# Patient Record
Sex: FEMALE | Race: WHITE
[De-identification: names, ages, dates, MRNs, and addresses within clinical notes are randomized per-mention and may not be internally consistent; named-entity substitution may affect disease eponyms.]

---

## 2018-06-28 ENCOUNTER — HOSPITAL ENCOUNTER (OUTPATIENT)
Age: 63
End: 2018-06-28
Payer: COMMERCIAL

## 2018-06-28 DIAGNOSIS — M54.2: Primary | ICD-10-CM

## 2018-06-28 PROCEDURE — 72050 X-RAY EXAM NECK SPINE 4/5VWS: CPT

## 2018-09-10 ENCOUNTER — HOSPITAL ENCOUNTER (OUTPATIENT)
Dept: HOSPITAL 100 - PT | Age: 63
Discharge: HOME | End: 2018-09-10
Payer: COMMERCIAL

## 2018-09-10 DIAGNOSIS — M65.4: ICD-10-CM

## 2018-09-10 DIAGNOSIS — M75.31: Primary | ICD-10-CM

## 2018-09-10 PROCEDURE — 97035 APP MDLTY 1+ULTRASOUND EA 15: CPT

## 2018-09-10 PROCEDURE — 97033 APP MDLTY 1+IONTPHRSIS EA 15: CPT

## 2018-09-10 PROCEDURE — 97161 PT EVAL LOW COMPLEX 20 MIN: CPT

## 2018-09-10 PROCEDURE — 97110 THERAPEUTIC EXERCISES: CPT

## 2018-10-15 ENCOUNTER — HOSPITAL ENCOUNTER (OUTPATIENT)
Age: 63
End: 2018-10-15
Payer: COMMERCIAL

## 2018-10-15 DIAGNOSIS — Z12.4: Primary | ICD-10-CM

## 2018-10-15 PROCEDURE — G0145 SCR C/V CYTO,THINLAYER,RESCR: HCPCS

## 2018-10-15 PROCEDURE — 88175 CYTOPATH C/V AUTO FLUID REDO: CPT

## 2018-10-26 ENCOUNTER — HOSPITAL ENCOUNTER (OUTPATIENT)
Age: 63
End: 2018-10-26
Payer: COMMERCIAL

## 2018-10-26 DIAGNOSIS — Z12.31: Primary | ICD-10-CM

## 2018-10-26 PROCEDURE — 77067 SCR MAMMO BI INCL CAD: CPT

## 2018-10-26 PROCEDURE — 77063 BREAST TOMOSYNTHESIS BI: CPT

## 2018-12-07 ENCOUNTER — HOSPITAL ENCOUNTER (OUTPATIENT)
Age: 63
End: 2018-12-07
Payer: COMMERCIAL

## 2018-12-07 DIAGNOSIS — M75.81: ICD-10-CM

## 2018-12-07 DIAGNOSIS — M75.41: Primary | ICD-10-CM

## 2018-12-07 PROCEDURE — 73221 MRI JOINT UPR EXTREM W/O DYE: CPT

## 2019-09-24 ENCOUNTER — HOSPITAL ENCOUNTER (OUTPATIENT)
Dept: HOSPITAL 100 - HPRAD | Age: 64
Discharge: HOME | End: 2019-09-24
Payer: COMMERCIAL

## 2019-09-24 VITALS — BODY MASS INDEX: 26.3 KG/M2

## 2019-09-24 DIAGNOSIS — M79.644: ICD-10-CM

## 2019-09-24 DIAGNOSIS — M65.341: Primary | ICD-10-CM

## 2019-09-24 PROCEDURE — 73130 X-RAY EXAM OF HAND: CPT

## 2019-10-28 ENCOUNTER — HOSPITAL ENCOUNTER (OUTPATIENT)
Dept: HOSPITAL 100 - OPBI | Age: 64
Discharge: HOME | End: 2019-10-28
Payer: COMMERCIAL

## 2019-10-28 VITALS — BODY MASS INDEX: 26.3 KG/M2

## 2019-10-28 DIAGNOSIS — Z12.31: Primary | ICD-10-CM

## 2019-10-28 PROCEDURE — 77063 BREAST TOMOSYNTHESIS BI: CPT

## 2019-10-28 PROCEDURE — 77067 SCR MAMMO BI INCL CAD: CPT

## 2020-06-18 ENCOUNTER — HOSPITAL ENCOUNTER (OUTPATIENT)
Age: 65
End: 2020-06-18
Payer: COMMERCIAL

## 2020-06-18 VITALS — BODY MASS INDEX: 26.3 KG/M2

## 2020-06-18 DIAGNOSIS — M79.602: Primary | ICD-10-CM

## 2020-06-18 PROCEDURE — 72050 X-RAY EXAM NECK SPINE 4/5VWS: CPT

## 2020-06-25 ENCOUNTER — HOSPITAL ENCOUNTER (OUTPATIENT)
Age: 65
Discharge: HOME | End: 2020-06-25
Payer: COMMERCIAL

## 2020-06-25 VITALS — BODY MASS INDEX: 26.3 KG/M2

## 2020-06-25 DIAGNOSIS — G54.2: Primary | ICD-10-CM

## 2020-06-25 DIAGNOSIS — M50.10: ICD-10-CM

## 2020-06-25 PROCEDURE — 72141 MRI NECK SPINE W/O DYE: CPT

## 2020-07-07 ENCOUNTER — HOSPITAL ENCOUNTER (OUTPATIENT)
Dept: HOSPITAL 100 - HPRAD | Age: 65
Discharge: HOME | End: 2020-07-07
Payer: COMMERCIAL

## 2020-07-07 VITALS — BODY MASS INDEX: 26.3 KG/M2

## 2020-07-07 DIAGNOSIS — M79.671: Primary | ICD-10-CM

## 2020-07-07 PROCEDURE — 73630 X-RAY EXAM OF FOOT: CPT

## 2020-07-31 ENCOUNTER — HOSPITAL ENCOUNTER (OUTPATIENT)
Dept: HOSPITAL 100 - PT | Age: 65
Discharge: HOME | End: 2020-07-31
Payer: COMMERCIAL

## 2020-07-31 VITALS — BODY MASS INDEX: 26.3 KG/M2

## 2020-07-31 DIAGNOSIS — M50.10: Primary | ICD-10-CM

## 2020-07-31 DIAGNOSIS — M48.02: ICD-10-CM

## 2020-07-31 PROCEDURE — 97161 PT EVAL LOW COMPLEX 20 MIN: CPT

## 2020-07-31 PROCEDURE — 97012 MECHANICAL TRACTION THERAPY: CPT

## 2020-07-31 PROCEDURE — 97164 PT RE-EVAL EST PLAN CARE: CPT

## 2020-07-31 PROCEDURE — 97110 THERAPEUTIC EXERCISES: CPT

## 2020-10-29 ENCOUNTER — HOSPITAL ENCOUNTER (OUTPATIENT)
Dept: HOSPITAL 100 - OPBI | Age: 65
Discharge: HOME | End: 2020-10-29
Payer: COMMERCIAL

## 2020-10-29 VITALS — BODY MASS INDEX: 25 KG/M2 | BODY MASS INDEX: 26.3 KG/M2

## 2020-10-29 DIAGNOSIS — Z12.31: Primary | ICD-10-CM

## 2020-10-29 PROCEDURE — 77063 BREAST TOMOSYNTHESIS BI: CPT

## 2020-10-29 PROCEDURE — 77067 SCR MAMMO BI INCL CAD: CPT

## 2021-03-04 ENCOUNTER — HOSPITAL ENCOUNTER (OUTPATIENT)
Dept: HOSPITAL 100 - IMMUN | Age: 66
LOS: 96 days | End: 2021-06-08
Payer: MEDICARE

## 2021-03-04 VITALS — BODY MASS INDEX: 25 KG/M2

## 2021-03-04 DIAGNOSIS — Z23: Primary | ICD-10-CM

## 2021-03-04 PROCEDURE — 91300: CPT

## 2021-03-04 PROCEDURE — 0001A: CPT

## 2021-03-04 PROCEDURE — 0002A: CPT

## 2021-03-04 RX ADMIN — RNA INGREDIENT BNT-162B2 30 MCG: 0.23 INJECTION, SUSPENSION INTRAMUSCULAR at 19:33

## 2021-03-25 RX ADMIN — RNA INGREDIENT BNT-162B2 30 MCG: 0.23 INJECTION, SUSPENSION INTRAMUSCULAR at 11:49

## 2021-11-01 ENCOUNTER — HOSPITAL ENCOUNTER (OUTPATIENT)
Age: 66
End: 2021-11-01
Payer: MEDICARE

## 2021-11-01 VITALS — BODY MASS INDEX: 25 KG/M2

## 2021-11-01 DIAGNOSIS — Z12.31: Primary | ICD-10-CM

## 2021-11-01 PROCEDURE — 77067 SCR MAMMO BI INCL CAD: CPT

## 2021-11-01 PROCEDURE — 77063 BREAST TOMOSYNTHESIS BI: CPT

## 2021-11-09 ENCOUNTER — HOSPITAL ENCOUNTER (OUTPATIENT)
Age: 66
End: 2021-11-09
Payer: MEDICARE

## 2021-11-09 DIAGNOSIS — R14.0: Primary | ICD-10-CM

## 2021-11-09 PROCEDURE — 76856 US EXAM PELVIC COMPLETE: CPT

## 2021-11-09 PROCEDURE — 76830 TRANSVAGINAL US NON-OB: CPT

## 2022-11-02 ENCOUNTER — HOSPITAL ENCOUNTER (OUTPATIENT)
Dept: HOSPITAL 100 - OPBI | Age: 67
Discharge: HOME | End: 2022-11-02
Payer: MEDICARE

## 2022-11-02 DIAGNOSIS — N89.8: ICD-10-CM

## 2022-11-02 DIAGNOSIS — Z12.31: Primary | ICD-10-CM

## 2022-11-02 PROCEDURE — 77067 SCR MAMMO BI INCL CAD: CPT

## 2022-11-02 PROCEDURE — 87070 CULTURE OTHR SPECIMN AEROBIC: CPT

## 2022-11-02 PROCEDURE — 87205 SMEAR GRAM STAIN: CPT

## 2022-11-02 PROCEDURE — 77063 BREAST TOMOSYNTHESIS BI: CPT

## 2023-09-21 ENCOUNTER — HOSPITAL ENCOUNTER (OUTPATIENT)
Age: 68
Discharge: HOME | End: 2023-09-21
Payer: MEDICARE

## 2023-09-21 DIAGNOSIS — H90.3: Primary | ICD-10-CM

## 2023-09-21 LAB
CREATININE FINGERSTICK: < 0.9 MG/DL (ref 0.55–1.02)
EGFR FINGERSTICK: > 60 ML/MIN (ref 60–?)

## 2023-09-21 PROCEDURE — A9575 INJ GADOTERATE MEGLUMI 0.1ML: HCPCS

## 2023-09-21 PROCEDURE — 70553 MRI BRAIN STEM W/O & W/DYE: CPT

## 2023-12-01 ENCOUNTER — HOSPITAL ENCOUNTER (OUTPATIENT)
Dept: HOSPITAL 100 - OPBI | Age: 68
Discharge: HOME | End: 2023-12-01
Payer: MEDICARE

## 2023-12-01 DIAGNOSIS — Z12.31: Primary | ICD-10-CM

## 2023-12-01 PROCEDURE — 77067 SCR MAMMO BI INCL CAD: CPT

## 2023-12-01 PROCEDURE — 77063 BREAST TOMOSYNTHESIS BI: CPT

## 2024-03-15 ENCOUNTER — HOSPITAL ENCOUNTER (OUTPATIENT)
Dept: RADIOLOGY | Facility: HOSPITAL | Age: 69
Discharge: HOME | End: 2024-03-15
Payer: MEDICARE

## 2024-03-15 DIAGNOSIS — I51.89 OTHER ILL-DEFINED HEART DISEASES: ICD-10-CM

## 2024-03-15 DIAGNOSIS — Z13.6 ENCOUNTER FOR SCREENING FOR CARDIOVASCULAR DISORDERS: ICD-10-CM

## 2024-03-15 DIAGNOSIS — I10 ESSENTIAL (PRIMARY) HYPERTENSION: ICD-10-CM

## 2024-03-15 PROCEDURE — 75571 CT HRT W/O DYE W/CA TEST: CPT

## 2024-08-06 ENCOUNTER — HOSPITAL ENCOUNTER (OUTPATIENT)
Dept: HOSPITAL 100 - PT | Age: 69
Discharge: HOME | End: 2024-08-06
Payer: MEDICARE

## 2024-08-06 DIAGNOSIS — M47.812: Primary | ICD-10-CM

## 2024-08-06 PROCEDURE — 97140 MANUAL THERAPY 1/> REGIONS: CPT

## 2024-08-06 PROCEDURE — 97012 MECHANICAL TRACTION THERAPY: CPT

## 2024-08-06 PROCEDURE — 97162 PT EVAL MOD COMPLEX 30 MIN: CPT

## 2024-08-06 PROCEDURE — 97035 APP MDLTY 1+ULTRASOUND EA 15: CPT

## 2024-12-02 ENCOUNTER — HOSPITAL ENCOUNTER (OUTPATIENT)
Dept: HOSPITAL 100 - OPBI | Age: 69
Discharge: HOME | End: 2024-12-02
Payer: MEDICARE

## 2024-12-02 DIAGNOSIS — Z12.31: Primary | ICD-10-CM

## 2024-12-02 PROCEDURE — 77063 BREAST TOMOSYNTHESIS BI: CPT

## 2024-12-02 PROCEDURE — 77067 SCR MAMMO BI INCL CAD: CPT

## 2024-12-02 NOTE — BI_ITS
REPORT-ID:[id number] 
 
MAMMOGRAPHY - BILATERAL SCREENING 
 
REASON FOR EXAM:    Female, 69 years old.  Routine annual screening 
examination. 
 
PERTINENT HISTORY:  Non-contributory. 
 
TECHNIQUE:   Digital bilateral breast paulette (3D mammographic acquisition) in 
the CC and MLO projections. 2-D mediolateral oblique (MLO) and craniocaudad 
(CC) views of both breasts were obtained.  CAD: Full Field Digital 
Mammography with Computer Added Detection was performed. 
 
COMPARISON:   Comparison is made with prior study dated December 1, 2023 
and November 2, 2022. 
___________________________________ 
 
FINDINGS: 
Breast Composition:  There are scattered areas of fibroglandular density. 
 
There are no dominant masses or suspicious calcifications. Stable small 
bilateral axillary lymph nodes. 
 
No other significant abnormalities are identified.  There has been no 
significant change since the prior study. 
___________________________________ 
 
ORDER #: [id number] BI/SCRN MAMM (CAD)W/PAULETTE BILAT  
IMPRESSION:  
Stable bilateral screening mammogram.  Yearly follow-up mammogram  
recommended.   (A)  
 
  
___________________________________  
 
  
ASSESSMENT CATEGORY:  
BIRADS Category 2:  Benign.  A letter regarding these results will be sent  
to the patient by the facility within 30 days.  
 
  
Approximately 10% of breast cancers are not detected by mammography.  A  
normal mammogram should not delay biopsy of a clinically suspicious  
abnormality.  
 
  
[id number]  
 
  
Electronically Signed:  
Bin Barrera MD  
2024/12/02 at 14:13 EST  
Reading Location ID and State: [address] / [place]  
Tel [phone], Service support  [phone], Fax [phone]